# Patient Record
Sex: FEMALE | Race: WHITE | Employment: PART TIME | URBAN - METROPOLITAN AREA
[De-identification: names, ages, dates, MRNs, and addresses within clinical notes are randomized per-mention and may not be internally consistent; named-entity substitution may affect disease eponyms.]

---

## 2021-02-04 ENCOUNTER — HOSPITAL ENCOUNTER (OUTPATIENT)
Age: 41
Discharge: HOME | End: 2021-02-04
Payer: COMMERCIAL

## 2021-02-04 ENCOUNTER — OFFICE VISIT (OUTPATIENT)
Dept: ENDOCRINOLOGY | Age: 41
End: 2021-02-04
Payer: COMMERCIAL

## 2021-02-04 VITALS
OXYGEN SATURATION: 97 % | BODY MASS INDEX: 42.27 KG/M2 | DIASTOLIC BLOOD PRESSURE: 78 MMHG | SYSTOLIC BLOOD PRESSURE: 140 MMHG | WEIGHT: 278 LBS | HEART RATE: 83 BPM | TEMPERATURE: 95.8 F

## 2021-02-04 DIAGNOSIS — E55.9 VITAMIN D DEFICIENCY: ICD-10-CM

## 2021-02-04 DIAGNOSIS — E89.0 POSTOPERATIVE HYPOTHYROIDISM: ICD-10-CM

## 2021-02-04 DIAGNOSIS — E89.0 POSTOPERATIVE HYPOTHYROIDISM: Primary | ICD-10-CM

## 2021-02-04 LAB
T4 FREE: 1.56 NG/DL (ref 0.93–1.7)
T4 TOTAL: 8.9 MCG/DL (ref 4.5–11.7)
TSH SERPL DL<=0.05 MIU/L-ACNC: 0.7 UIU/ML (ref 0.27–4.2)
VITAMIN D 25-HYDROXY: 20 NG/ML (ref 30–100)

## 2021-02-04 PROCEDURE — 36415 COLL VENOUS BLD VENIPUNCTURE: CPT

## 2021-02-04 PROCEDURE — 84439 ASSAY OF FREE THYROXINE: CPT

## 2021-02-04 PROCEDURE — 99204 OFFICE O/P NEW MOD 45 MIN: CPT | Performed by: INTERNAL MEDICINE

## 2021-02-04 PROCEDURE — 84443 ASSAY THYROID STIM HORMONE: CPT

## 2021-02-04 PROCEDURE — 82306 VITAMIN D 25 HYDROXY: CPT

## 2021-02-04 RX ORDER — RIVAROXABAN 20 MG/1
TABLET, FILM COATED ORAL
COMMUNITY
Start: 2021-02-02

## 2021-02-04 RX ORDER — SERTRALINE HYDROCHLORIDE 100 MG/1
TABLET, FILM COATED ORAL
COMMUNITY
Start: 2021-02-03

## 2021-02-04 RX ORDER — LEVOTHYROXINE SODIUM 0.2 MG/1
TABLET ORAL
COMMUNITY
Start: 2021-01-27 | End: 2021-07-26 | Stop reason: SDUPTHER

## 2021-02-04 NOTE — PROGRESS NOTES
[address] [place] [address] Department of Endocrinology Diabetes and Metabolism   [address] [facility] [address]   Phone: [phone]  Fax: [phone]    Date of Service: 2021    Primary Care Physician: Douglas Jensen DO  Referring physician: Ronnell Moore*  Provider: Manfred Donis MD        Reason for the visit:  Postsurgical Hypothyroidism    History of Present Illness: The history is provided by the patient. No  was used. Accuracy of the patient data is excellent. Theador Dose is a very pleasant 36 y.o. 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 Pathology was benign     She is currently on Levothyroxine 200 mcg daily, Patient takes levothyroxine in the morning at empty stomach, wait one hour before eating , avoid multivitamins containing calcium  or iron with it. \    Lab Results   Component Value Date/Time    TSH 0.704 2021 04:04 PM    T4FREE 1.56 2021 04:04 PM    Z6AAFSJ 8.9 2021 04:04 PM     PAST MEDICAL HISTORY   Past Medical History:   Diagnosis Date    YVONNE (acute kidney injury) ([facility] [address] 2014    Blood clot in the legs 2011    left leg       PAST SURGICAL HISTORY   Past Surgical History:   Procedure Laterality Date     SECTION  08/29/2011    X2    THROMBECTOMY      TUBAL LIGATION      VENA CAVA FILTER PLACEMENT         SOCIAL HISTORY   Tobacco:   reports that she quit smoking about 9 years ago. She has never used smokeless tobacco.  Alcohol:   reports no history of alcohol use. Drugs:   reports no history of drug use. FAMILY HISTORY   No family history on file.     ALLERGIES AND DRUG REACTIONS   No Known Allergies    CURRENT MEDICATIONS   Current Outpatient Medications   Medication Sig Dispense Refill    levothyroxine (SYNTHROID) 200 MCG tablet TAKE ONE TABLET BY MOUTH EVERY DAY      sertraline (ZOLOFT) 100 MG tablet  XARELTO 20 MG TABS tablet       Multiple Vitamins-Minerals (THERAPEUTIC MULTIVITAMIN-MINERALS) tablet Take 1 tablet by mouth daily. No current facility-administered medications for this visit. Review of Systems  Constitutional: No fever, no chills, no diaphoresis, no generalized weakness. HEENT: No blurred vision, No sore throat, no ear pain, no hair loss  Neck: denied any neck swelling, difficulty swallowing,   Cadrdiopulomary: No CP, SOB or palpitation, No orthopnea or PND. No cough or wheezing. GI: No N/V/D, no constipation, No abdominal pain, no melena or hematochezia   : Denied any dysuria, hematuria, flank pain, discharge, or incontinence. Skin: denied any rash, ulcer, Hirsute, or hyperpigmentation. MSK: denied any joint deformity, joint pain/swelling, muscle pain, or back pain. Neuro: no numbess, no tingling, no weakness,     OBJECTIVE    BP (!) 140/78   Pulse 83   Temp 95.8 °F (35.4 °C)   Wt 278 lb (126.1 kg)   SpO2 97%   BMI 42.27 kg/m²   BP Readings from Last 4 Encounters:   02/04/21 (!) 140/78   09/30/11 124/86     Wt Readings from Last 6 Encounters:   02/04/21 278 lb (126.1 kg)   09/30/11 230 lb (104.3 kg)   09/29/11 230 lb (104.3 kg)       Physical examination:  General: awake alert, oriented x3, no abnormal position or movements. HEENT: normocephalic non traumatic  Neck: supple, no LN enlargement, s/p total thyroidectomy, no JVD. Pulm: Clear equal air entry no added sounds, no wheezing or rhonchi    CVS: S1 + S2, no murmur, no heave. Dorsalis pedis pulse palpable   Abd: soft lax, no tenderness, no organomegaly, audible bowel sounds. Skin: warm, no lesions, no rash.   Musculoskeletal: No back tenderness, no kyphosis/scoliosis   Neuro: CN intact, sensation normal , muscle power normal.  Psych: normal mood, and affect    Review of Laboratory Data:  I have reviewed the following:  Lab Results   Component Value Date/Time    WBC 5.4 05/18/2015 08:11 PM    RBC 4.89 05/18/2015 08:11 PM    HGB 13.3 05/18/2015 08:11 PM    HCT 40.3 05/18/2015 08:11 PM    MCV 82.4 05/18/2015 08:11 PM    MCH 27.3 05/18/2015 08:11 PM    MCHC 33.1 05/18/2015 08:11 PM    RDW 15.3 (H) 05/18/2015 08:11 PM     05/18/2015 08:11 PM    MPV 9.4 05/18/2015 08:11 PM      Lab Results   Component Value Date/Time     05/18/2015 08:11 PM    K 4.1 05/18/2015 08:11 PM    CO2 21 (L) 05/18/2015 08:11 PM    BUN 11 05/18/2015 08:11 PM    CREATININE 0.8 05/18/2015 08:11 PM    CALCIUM 9.0 05/18/2015 08:11 PM    LABGLOM >60 05/18/2015 08:11 PM    GFRAA >60 05/18/2015 08:11 PM      Lab Results   Component Value Date/Time    TSH 3.750 12/01/2014 06:43 AM    T4FREE 0.99 12/01/2014 06:43 AM     Lab Results   Component Value Date    GLUCOSE 94 05/18/2015    GLUCOSE 85 10/01/2011     Lab Results   Component Value Date    TRIG 122 12/01/2014    HDL 48 12/01/2014    LDLCALC 108 12/01/2014    CHOL 180 12/01/2014     No results found for: VITD25    ASSESSMENT & RECOMMENDATIONS   Conchis Douglas, a 36 y.o.-old female seen in for following issues     Postsurgical hypothyroidism   · At goal, continue Levothyroxine 200 mcg daily   · TFT before next visit     vitD deficiency   · Continue vitd supplement  · Check level     I personally reviewed external notes from PCP and other patient's care team providers, and personally interpreted labs associated with the above diagnosis. I also ordered labs to further assess and manage the above addressed medical conditions. Return in about 6 months (around 7/26/2021) for Hypothyroidism . The above issues were reviewed with the patient who understood and agreed with the plan. Thank you for allowing us to participate in the care of this patient. Please do not hesitate to contact us with any additional questions. Diagnosis Orders   1. Postoperative hypothyroidism  TSH without Reflex    T4, Free    T4   2.  Vitamin D deficiency  Vitamin D 25 Hydroxy     Efrain Abusag MD  Endocrinologist, [facility] Diabetes Care and Endocrinology   [address] [facility] [address]   Phone: [phone]  Fax: [phone]  ------------------------------  An electronic signature was used to authenticate this note.  Keturah Schwartz MD on 2/4/2021 at 2:41 PM

## 2021-02-05 ENCOUNTER — TELEPHONE (OUTPATIENT)
Dept: ENDOCRINOLOGY | Age: 41
End: 2021-02-05

## 2021-02-05 DIAGNOSIS — E55.9 VITAMIN D DEFICIENCY: Primary | ICD-10-CM

## 2021-02-05 NOTE — TELEPHONE ENCOUNTER
Notify pt,  I have reviewed your recent results    Thyroid hormones are very good. continue current dose of thyroid medication     vitD level was low. Take Vitamin D 50.000 units one tab once a week for 8 weeks. At the end of 8 weeks, start taking Vitamin D3 2000 units daily over the counter.  Prescription for weekly vitD sent to the pharmacy

## 2021-03-12 ENCOUNTER — HOSPITAL ENCOUNTER (OUTPATIENT)
Dept: GENERAL RADIOLOGY | Age: 41
Discharge: HOME | End: 2021-03-14
Payer: COMMERCIAL

## 2021-03-12 DIAGNOSIS — Z12.31 VISIT FOR SCREENING MAMMOGRAM: ICD-10-CM

## 2021-03-12 PROCEDURE — 77063 BREAST TOMOSYNTHESIS BI: CPT

## 2021-04-26 ENCOUNTER — HOSPITAL ENCOUNTER (OUTPATIENT)
Age: 41
Discharge: HOME | End: 2021-04-28

## 2021-04-26 PROCEDURE — 88305 TISSUE EXAM BY PATHOLOGIST: CPT

## 2021-07-26 ENCOUNTER — OFFICE VISIT (OUTPATIENT)
Dept: ENDOCRINOLOGY | Age: 41
End: 2021-07-26
Payer: COMMERCIAL

## 2021-07-26 VITALS
SYSTOLIC BLOOD PRESSURE: 132 MMHG | DIASTOLIC BLOOD PRESSURE: 74 MMHG | OXYGEN SATURATION: 97 % | HEIGHT: 68 IN | WEIGHT: 289 LBS | BODY MASS INDEX: 43.8 KG/M2 | HEART RATE: 78 BPM | RESPIRATION RATE: 18 BRPM

## 2021-07-26 DIAGNOSIS — E55.9 VITAMIN D DEFICIENCY: Primary | ICD-10-CM

## 2021-07-26 DIAGNOSIS — E89.0 POSTOPERATIVE HYPOTHYROIDISM: ICD-10-CM

## 2021-07-26 PROCEDURE — 99214 OFFICE O/P EST MOD 30 MIN: CPT | Performed by: INTERNAL MEDICINE

## 2021-07-26 RX ORDER — LEVOTHYROXINE SODIUM 0.2 MG/1
200 TABLET ORAL DAILY
Qty: 90 TABLET | Refills: 3 | Status: SHIPPED
Start: 2021-07-26 | End: 2022-08-09 | Stop reason: SDUPTHER

## 2021-07-26 NOTE — PROGRESS NOTES
[address] [place] [address] Department of Endocrinology Diabetes and Metabolism   [address] [facility], [address] [place] [address],[address] [address]   Phone: [phone]  Fax: [phone]    Date of Service: 2021  Primary Care Physician: Taryn Villavicencio DO  Provider: Ac Dallas MD        Reason for the visit:  Postsurgical Hypothyroidism    History of Present Illness: The history is provided by the patient. No  was used. Accuracy of the patient data is excellent. Rea Stanley is a very pleasant 39 y.o. 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 Pathology was benign     She is currently on Levothyroxine 200 mcg daily, Patient takes levothyroxine in the morning at empty stomach, wait one hour before eating , avoid multivitamins containing calcium  or iron with it. \    Lab Results   Component Value Date/Time    TSH 0.704 2021 04:04 PM    T4FREE 1.56 2021 04:04 PM    X0LWPMX 8.9 2021 04:04 PM     PAST MEDICAL HISTORY   Past Medical History:   Diagnosis Date    YVONNE (acute kidney injury) ([address] [address] 2014    Blood clot in the legs 2011    left leg       PAST SURGICAL HISTORY   Past Surgical History:   Procedure Laterality Date     SECTION  08/29/2011    X2    THROMBECTOMY      TUBAL LIGATION      VENA CAVA FILTER PLACEMENT         SOCIAL HISTORY   Tobacco:   reports that she quit smoking about 9 years ago. She has never used smokeless tobacco.  Alcohol:   reports no history of alcohol use. Drugs:   reports no history of drug use. FAMILY HISTORY   No family history on file.     ALLERGIES AND DRUG REACTIONS   No Known Allergies    CURRENT MEDICATIONS   Current Outpatient Medications   Medication Sig Dispense Refill    levothyroxine (SYNTHROID) 200 MCG tablet Take 1 tablet by mouth Daily 90 tablet 3    vitamin D (CHOLECALCIFEROL) 60275 UNIT CAPS Take 1 capsule weekly 8 capsule 0    sertraline (ZOLOFT) 100 MG tablet       XARELTO 20 MG TABS tablet       Multiple Vitamins-Minerals (THERAPEUTIC MULTIVITAMIN-MINERALS) tablet Take 1 tablet by mouth daily. No current facility-administered medications for this visit. Review of Systems  Constitutional: No fever, no chills, no diaphoresis, no generalized weakness. HEENT: No blurred vision, No sore throat, no ear pain, no hair loss  Neck: denied any neck swelling, difficulty swallowing,   Cadrdiopulomary: No CP, SOB or palpitation, No orthopnea or PND. No cough or wheezing. GI: No N/V/D, no constipation, No abdominal pain, no melena or hematochezia   : Denied any dysuria, hematuria, flank pain, discharge, or incontinence. Skin: denied any rash, ulcer, Hirsute, or hyperpigmentation. MSK: denied any joint deformity, joint pain/swelling, muscle pain, or back pain. Neuro: no numbess, no tingling, no weakness,     OBJECTIVE    /74   Pulse 78   Resp 18   Ht 5' 8\" (1.727 m)   Wt 289 lb (131.1 kg)   SpO2 97%   BMI 43.94 kg/m²   BP Readings from Last 4 Encounters:   07/26/21 132/74   02/04/21 (!) 140/78   09/30/11 124/86     Wt Readings from Last 6 Encounters:   07/26/21 289 lb (131.1 kg)   02/04/21 278 lb (126.1 kg)   09/30/11 230 lb (104.3 kg)   09/29/11 230 lb (104.3 kg)       Physical examination:  General: awake alert, oriented x3, no abnormal position or movements. HEENT: normocephalic non traumatic  Neck: supple, no LN enlargement, s/p total thyroidectomy, no JVD. Pulm: Clear equal air entry no added sounds, no wheezing or rhonchi    CVS: S1 + S2, no murmur, no heave. Dorsalis pedis pulse palpable   Abd: soft lax, no tenderness, no organomegaly, audible bowel sounds. Skin: warm, no lesions, no rash.   Musculoskeletal: No back tenderness, no kyphosis/scoliosis   Neuro: CN intact, sensation normal , muscle power normal.  Psych: normal mood, and affect    Review of Laboratory Data:  I have reviewed the following:  Lab Results   Component Value Date/Time    WBC 5.4 05/18/2015 08:11 PM    RBC 4.89 05/18/2015 08:11 PM    HGB 13.3 05/18/2015 08:11 PM    HCT 40.3 05/18/2015 08:11 PM    MCV 82.4 05/18/2015 08:11 PM    MCH 27.3 05/18/2015 08:11 PM    MCHC 33.1 05/18/2015 08:11 PM    RDW 15.3 (H) 05/18/2015 08:11 PM     05/18/2015 08:11 PM    MPV 9.4 05/18/2015 08:11 PM      Lab Results   Component Value Date/Time     05/18/2015 08:11 PM    K 4.1 05/18/2015 08:11 PM    CO2 21 (L) 05/18/2015 08:11 PM    BUN 11 05/18/2015 08:11 PM    CREATININE 0.8 05/18/2015 08:11 PM    CALCIUM 9.0 05/18/2015 08:11 PM    LABGLOM >60 05/18/2015 08:11 PM    GFRAA >60 05/18/2015 08:11 PM      Lab Results   Component Value Date/Time    TSH 0.704 02/04/2021 04:04 PM    T4FREE 1.56 02/04/2021 04:04 PM    [id number] 8.9 02/04/2021 04:04 PM     Lab Results   Component Value Date    GLUCOSE 94 05/18/2015    GLUCOSE 85 10/01/2011     Lab Results   Component Value Date    TRIG 122 12/01/2014    HDL 48 12/01/2014    LDLCALC 108 12/01/2014    CHOL 180 12/01/2014     Lab Results   Component Value Date    VITD25 20 02/04/2021       ASSESSMENT & RECOMMENDATIONS   Yaneli Way, a 39 y.o.-old female seen in for following issues     Postsurgical hypothyroidism   · Levothyroxine 200 mcg daily   · TFT in few weeks      vitD deficiency   · Continue vitd supplement supplement   · Check level     I personally reviewed external notes from PCP and other patient's care team providers, and personally interpreted labs associated with the above diagnosis. I also ordered labs to further assess and manage the above addressed medical conditions. Return in about 1 year (around 7/26/2022) for hypothyroidism, VitD deficiency. The above issues were reviewed with the patient who understood and agreed with the plan. Thank you for allowing us to participate in the care of this patient. Please do not hesitate to contact us with any additional questions.     Diagnosis Orders 1. Vitamin D deficiency  Vitamin D 25 Hydroxy    Basic Metabolic Panel   2. Postoperative hypothyroidism  TSH without Reflex    T4, Free    levothyroxine (SYNTHROID) 200 MCG tablet     Elin Souza MD  Endocrinologist, [place] [address] Diabetes Care and Endocrinology   [address],[facility] [address] [address]   Phone: [phone]  Fax: [phone]  ------------------------------  An electronic signature was used to authenticate this note.  Anu Borjas MD on 7/26/2021 at 2:31 PM

## 2021-09-19 ENCOUNTER — TELEPHONE (OUTPATIENT)
Dept: ENDOCRINOLOGY | Age: 41
End: 2021-09-19

## 2021-09-21 NOTE — TELEPHONE ENCOUNTER
Called pt and left VM informing to get labs done and requested a call back to make sure the message was received.

## 2022-08-08 ENCOUNTER — HOSPITAL ENCOUNTER (OUTPATIENT)
Age: 42
Discharge: HOME | End: 2022-08-08
Payer: COMMERCIAL

## 2022-08-08 DIAGNOSIS — E89.0 POSTOPERATIVE HYPOTHYROIDISM: ICD-10-CM

## 2022-08-08 DIAGNOSIS — E89.0 POSTOPERATIVE HYPOTHYROIDISM: Primary | ICD-10-CM

## 2022-08-08 LAB
T4 FREE: 1.84 NG/DL (ref 0.93–1.7)
TSH SERPL DL<=0.05 MIU/L-ACNC: 1.21 UIU/ML (ref 0.27–4.2)

## 2022-08-08 PROCEDURE — 84443 ASSAY THYROID STIM HORMONE: CPT

## 2022-08-08 PROCEDURE — 36415 COLL VENOUS BLD VENIPUNCTURE: CPT

## 2022-08-08 PROCEDURE — 84439 ASSAY OF FREE THYROXINE: CPT

## 2022-08-09 ENCOUNTER — OFFICE VISIT (OUTPATIENT)
Dept: ENDOCRINOLOGY | Age: 42
End: 2022-08-09
Payer: COMMERCIAL

## 2022-08-09 VITALS
BODY MASS INDEX: 42.59 KG/M2 | HEART RATE: 78 BPM | HEIGHT: 68 IN | OXYGEN SATURATION: 97 % | WEIGHT: 281 LBS | DIASTOLIC BLOOD PRESSURE: 81 MMHG | SYSTOLIC BLOOD PRESSURE: 124 MMHG

## 2022-08-09 DIAGNOSIS — E55.9 VITAMIN D DEFICIENCY: ICD-10-CM

## 2022-08-09 DIAGNOSIS — E89.0 POSTOPERATIVE HYPOTHYROIDISM: Primary | ICD-10-CM

## 2022-08-09 PROCEDURE — 99214 OFFICE O/P EST MOD 30 MIN: CPT | Performed by: CLINICAL NURSE SPECIALIST

## 2022-08-09 RX ORDER — LEVOTHYROXINE SODIUM 0.2 MG/1
200 TABLET ORAL DAILY
Qty: 90 TABLET | Refills: 3 | Status: SHIPPED | OUTPATIENT
Start: 2022-08-09

## 2022-08-09 RX ORDER — BUSPIRONE HYDROCHLORIDE 7.5 MG/1
TABLET ORAL
COMMUNITY
Start: 2022-07-26

## 2022-08-09 NOTE — PROGRESS NOTES
[address] [place] [address] Department of Endocrinology Diabetes and Metabolism   [address] [facility] [address]   Phone: [phone]  Fax: [phone]    Date of Service: 2022  Primary Care Physician: Phil Aly DO  Provider: DANIEL Munoz      Reason for the visit:  Postsurgical Hypothyroidism    History of Present Illness: The history is provided by the patient. No  was used. Accuracy of the patient data is excellent. Fabio Phelan is a very pleasant 43 y.o. 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 Pathology was benign     She is currently on Levothyroxine 200 mcg daily, Patient takes levothyroxine in the morning at empty stomach, wait one hour before eating , avoid multivitamins containing calcium  or iron with it. Lab Results   Component Value Date/Time    TSH 1.210 2022 02:46 PM    T4FREE 1.84 (H) 2022 02:46 PM    E4RPDJX 8.9 2021 04:04 PM     PAST MEDICAL HISTORY   Past Medical History:   Diagnosis Date    YVONNE (acute kidney injury) ([facility] [address] 2014    Blood clot in the legs 2011    left leg       PAST SURGICAL HISTORY   Past Surgical History:   Procedure Laterality Date     SECTION  08/29/2011    X2    THROMBECTOMY      TUBAL LIGATION      VENA CAVA FILTER PLACEMENT         SOCIAL HISTORY   Tobacco:   reports that she quit smoking about 10 years ago. She has never used smokeless tobacco.  Alcohol:   reports no history of alcohol use. Drugs:   reports no history of drug use. FAMILY HISTORY   No family history on file. ALLERGIES AND DRUG REACTIONS   No Known Allergies    CURRENT MEDICATIONS   Current Outpatient Medications   Medication Sig Dispense Refill    busPIRone (BUSPAR) 7.5 MG tablet TAKE ONE TABLET BY MOUTH EVERY DAY      levothyroxine (SYNTHROID) 200 MCG tablet Take 1 tablet by mouth in the morning.  90 tablet 3 vitamin D (CHOLECALCIFEROL) 79870 UNIT CAPS Take 1 capsule weekly 8 capsule 0    sertraline (ZOLOFT) 100 MG tablet       XARELTO 20 MG TABS tablet       Multiple Vitamins-Minerals (THERAPEUTIC MULTIVITAMIN-MINERALS) tablet Take 1 tablet by mouth daily. No current facility-administered medications for this visit. Review of Systems  Constitutional: No fever, no chills, no diaphoresis, no generalized weakness. HEENT: No blurred vision, No sore throat, no ear pain, no hair loss  Neck: denied any neck swelling, difficulty swallowing,   Cadrdiopulomary: No CP, SOB or palpitation, No orthopnea or PND. No cough or wheezing. GI: No N/V/D, no constipation, No abdominal pain, no melena or hematochezia   : Denied any dysuria, hematuria, flank pain, discharge, or incontinence. Skin: denied any rash, ulcer, Hirsute, or hyperpigmentation. MSK: denied any joint deformity, joint pain/swelling, muscle pain, or back pain. Neuro: no numbess, no tingling, no weakness,     OBJECTIVE    /81   Pulse 78   Ht 5' 8\" (1.727 m)   Wt 281 lb (127.5 kg)   SpO2 97%   BMI 42.73 kg/m²   BP Readings from Last 4 Encounters:   08/09/22 124/81   07/26/21 132/74   02/04/21 (!) 140/78   09/30/11 124/86     Wt Readings from Last 6 Encounters:   08/09/22 281 lb (127.5 kg)   07/26/21 289 lb (131.1 kg)   02/04/21 278 lb (126.1 kg)   09/30/11 230 lb (104.3 kg)   09/29/11 230 lb (104.3 kg)       Physical examination:  General: awake alert, oriented x3, no abnormal position or movements. HEENT: normocephalic non traumatic  Neck: supple, no LN enlargement, s/p total thyroidectomy, no JVD. Pulm: Clear equal air entry no added sounds, no wheezing or rhonchi    CVS: S1 + S2, no murmur, no heave. Dorsalis pedis pulse palpable   Abd: soft lax, no tenderness, no organomegaly, audible bowel sounds. Skin: warm, no lesions, no rash.   Musculoskeletal: No back tenderness, no kyphosis/scoliosis   Neuro: CN intact, sensation normal , muscle addressed medical conditions. Return in about 1 year (around 8/9/2023). The above issues were reviewed with the patient who understood and agreed with the plan. Thank you for allowing us to participate in the care of this patient. Please do not hesitate to contact us with any additional questions. Diagnosis Orders   1. Postoperative hypothyroidism  levothyroxine (SYNTHROID) 200 MCG tablet      2. Vitamin D deficiency          Curlie Calvillo, APRN - CNS    Endocrinologist, [facility]   [address] [place] [address]   Phone: [phone]  Fax: [phone]  ------------------------------  An electronic signature was used to authenticate this note.  DANIEL Marin  on 8/9/2022 at 4:08 PM

## 2023-05-05 ENCOUNTER — HOSPITAL ENCOUNTER (OUTPATIENT)
Dept: GENERAL RADIOLOGY | Age: 43
Discharge: HOME | End: 2023-05-05
Payer: COMMERCIAL

## 2023-05-05 DIAGNOSIS — Z12.31 VISIT FOR SCREENING MAMMOGRAM: ICD-10-CM

## 2023-05-05 PROCEDURE — 77063 BREAST TOMOSYNTHESIS BI: CPT

## 2023-05-08 DIAGNOSIS — R92.8 ABNORMAL MAMMOGRAM: Primary | ICD-10-CM

## 2023-05-09 ENCOUNTER — TELEPHONE (OUTPATIENT)
Dept: GENERAL RADIOLOGY | Age: 43
End: 2023-05-09

## 2023-05-09 NOTE — TELEPHONE ENCOUNTER
Call to patient in reference to her mammogram performed at [facility] on May 5, 2023. Instructed patient that the radiologist has recommended some additional breast imaging, in order to make a final determination/result. Verbalizes understanding and is agreeable to proceed.

## 2023-05-12 ENCOUNTER — HOSPITAL ENCOUNTER (OUTPATIENT)
Dept: GENERAL RADIOLOGY | Age: 43
Discharge: HOME | End: 2023-05-12
Payer: COMMERCIAL

## 2023-05-12 ENCOUNTER — APPOINTMENT (OUTPATIENT)
Dept: GENERAL RADIOLOGY | Age: 43
End: 2023-05-12
Payer: COMMERCIAL

## 2023-05-12 DIAGNOSIS — R92.8 ABNORMAL MAMMOGRAM: ICD-10-CM

## 2023-05-12 PROCEDURE — 76642 ULTRASOUND BREAST LIMITED: CPT

## 2023-05-12 PROCEDURE — 77065 DX MAMMO INCL CAD UNI: CPT

## 2023-05-12 PROCEDURE — G0279 TOMOSYNTHESIS, MAMMO: HCPCS

## 2023-08-09 DIAGNOSIS — E89.0 POSTOPERATIVE HYPOTHYROIDISM: ICD-10-CM

## 2023-08-10 RX ORDER — LEVOTHYROXINE SODIUM 0.2 MG/1
200 TABLET ORAL DAILY
Qty: 90 TABLET | Refills: 5 | Status: SHIPPED | OUTPATIENT
Start: 2023-08-10

## 2023-10-02 ENCOUNTER — TELEPHONE (OUTPATIENT)
Dept: BREAST CENTER | Age: 43
End: 2023-10-02

## 2023-10-02 NOTE — TELEPHONE ENCOUNTER
RN contacted patient PCP office Juan Daniel Bridges who is the provider that prescribes patients Xarelto 20mg. RN spoke to Zhane who does referral in regards to if a biopsy Is warranted once our radiologist review the films we need instructions for patients Xarelto. Zhane is going to verify with  the instructions for patient and will be back in touch once doctor responds. RN verbalized understanding.        Electronically signed by Chelsie Santos RN on 10/2/23 at 3:27 PM EDT

## 2023-10-02 NOTE — TELEPHONE ENCOUNTER
RN received call from patient in regards to referral from [address] [place] [address] for abnormal imaging left breast mass. RN reviewed patient imaging and scanned in patient referral and external imaging. RN spoke to patient in regards to recommendations and the way the referral will move forward. RN explained bx is recommended and because her imaging was done at [facility] she needs to contact them and request recent breast MRI place on disc and bring to our office. RN explained once we received disc we will have uploaded into our system and our breast radiologist will review it. Once radiologist review imaging and give recommendations we will then be back in contact with patient to discuss recommendations. Patient is taking Xarelto 20mg prescribed by . RN will contact PCP and request written instructions on patients holding prior to biopsy. RN informed patient of this information. Patient verbalized understanding.        Electronically signed by Jose C Aragon RN on 10/2/23 at 12:17 PM EDT

## 2023-10-03 ENCOUNTER — HOSPITAL ENCOUNTER (EMERGENCY)
Age: 43
Discharge: HOME | End: 2023-10-03
Attending: STUDENT IN AN ORGANIZED HEALTH CARE EDUCATION/TRAINING PROGRAM
Payer: COMMERCIAL

## 2023-10-03 ENCOUNTER — APPOINTMENT (OUTPATIENT)
Dept: CT IMAGING | Age: 43
End: 2023-10-03
Payer: COMMERCIAL

## 2023-10-03 VITALS
WEIGHT: 253 LBS | TEMPERATURE: 98.1 F | SYSTOLIC BLOOD PRESSURE: 139 MMHG | BODY MASS INDEX: 37.47 KG/M2 | DIASTOLIC BLOOD PRESSURE: 78 MMHG | HEIGHT: 69 IN | HEART RATE: 66 BPM | RESPIRATION RATE: 16 BRPM | OXYGEN SATURATION: 98 %

## 2023-10-03 DIAGNOSIS — R10.9 ABDOMINAL PAIN, UNSPECIFIED ABDOMINAL LOCATION: Primary | ICD-10-CM

## 2023-10-03 DIAGNOSIS — K80.20 GALL STONES: ICD-10-CM

## 2023-10-03 LAB
ALBUMIN SERPL-MCNC: 3.6 G/DL (ref 3.5–5.2)
ALP SERPL-CCNC: 35 U/L (ref 35–104)
ALT SERPL-CCNC: 72 U/L (ref 0–32)
ANION GAP SERPL CALCULATED.3IONS-SCNC: 11 MMOL/L (ref 7–16)
AST SERPL-CCNC: 145 U/L (ref 0–31)
BACTERIA URNS QL MICRO: ABNORMAL
BASOPHILS # BLD: 0.02 K/UL (ref 0–0.2)
BASOPHILS NFR BLD: 0 % (ref 0–2)
BILIRUB SERPL-MCNC: 1.8 MG/DL (ref 0–1.2)
BILIRUB UR QL STRIP: ABNORMAL
BUN SERPL-MCNC: 10 MG/DL (ref 6–20)
CALCIUM SERPL-MCNC: 8.6 MG/DL (ref 8.6–10.2)
CHLORIDE SERPL-SCNC: 104 MMOL/L (ref 98–107)
CLARITY UR: ABNORMAL
CO2 SERPL-SCNC: 24 MMOL/L (ref 22–29)
COLOR UR: YELLOW
CREAT SERPL-MCNC: 1 MG/DL (ref 0.5–1)
EOSINOPHIL # BLD: 0.2 K/UL (ref 0.05–0.5)
EOSINOPHILS RELATIVE PERCENT: 4 % (ref 0–6)
EPI CELLS #/AREA URNS HPF: ABNORMAL /HPF
ERYTHROCYTE [DISTWIDTH] IN BLOOD BY AUTOMATED COUNT: 13.8 % (ref 11.5–15)
GFR SERPL CREATININE-BSD FRML MDRD: >60 ML/MIN/1.73M2
GLUCOSE SERPL-MCNC: 88 MG/DL (ref 74–99)
GLUCOSE UR STRIP-MCNC: NEGATIVE MG/DL
HCT VFR BLD AUTO: 43.6 % (ref 34–48)
HGB BLD-MCNC: 14.4 G/DL (ref 11.5–15.5)
HGB UR QL STRIP.AUTO: NEGATIVE
IMM GRANULOCYTES # BLD AUTO: <0.03 K/UL (ref 0–0.58)
IMM GRANULOCYTES NFR BLD: 0 % (ref 0–5)
KETONES UR STRIP-MCNC: >80 MG/DL
LEUKOCYTE ESTERASE UR QL STRIP: ABNORMAL
LYMPHOCYTES NFR BLD: 1.3 K/UL (ref 1.5–4)
LYMPHOCYTES RELATIVE PERCENT: 23 % (ref 20–42)
MCH RBC QN AUTO: 29 PG (ref 26–35)
MCHC RBC AUTO-ENTMCNC: 33 G/DL (ref 32–34.5)
MCV RBC AUTO: 87.9 FL (ref 80–99.9)
MONOCYTES NFR BLD: 0.26 K/UL (ref 0.1–0.95)
MONOCYTES NFR BLD: 5 % (ref 2–12)
NEUTROPHILS NFR BLD: 68 % (ref 43–80)
NEUTS SEG NFR BLD: 3.83 K/UL (ref 1.8–7.3)
NITRITE UR QL STRIP: NEGATIVE
PH UR STRIP: 5.5 [PH] (ref 5–9)
PLATELET # BLD AUTO: 158 K/UL (ref 130–450)
PLATELET CONFIRMATION: NORMAL
PMV BLD AUTO: 12.5 FL (ref 7–12)
POTASSIUM SERPL-SCNC: 3.6 MMOL/L (ref 3.5–5)
PROT SERPL-MCNC: 6.2 G/DL (ref 6.4–8.3)
PROT UR STRIP-MCNC: NEGATIVE MG/DL
RBC # BLD AUTO: 4.96 M/UL (ref 3.5–5.5)
RBC #/AREA URNS HPF: ABNORMAL /HPF
SODIUM SERPL-SCNC: 139 MMOL/L (ref 132–146)
SP GR UR STRIP: 1.02 (ref 1–1.03)
UROBILINOGEN UR STRIP-ACNC: 4 EU/DL (ref 0–1)
WBC #/AREA URNS HPF: ABNORMAL /HPF
WBC OTHER # BLD: 5.6 K/UL (ref 4.5–11.5)

## 2023-10-03 PROCEDURE — 80053 COMPREHEN METABOLIC PANEL: CPT

## 2023-10-03 PROCEDURE — 85025 COMPLETE CBC W/AUTO DIFF WBC: CPT

## 2023-10-03 PROCEDURE — 74177 CT ABD & PELVIS W/CONTRAST: CPT

## 2023-10-03 PROCEDURE — 6360000002 HC RX W HCPCS: Performed by: STUDENT IN AN ORGANIZED HEALTH CARE EDUCATION/TRAINING PROGRAM

## 2023-10-03 PROCEDURE — 2580000003 HC RX 258: Performed by: STUDENT IN AN ORGANIZED HEALTH CARE EDUCATION/TRAINING PROGRAM

## 2023-10-03 PROCEDURE — 81001 URINALYSIS AUTO W/SCOPE: CPT

## 2023-10-03 PROCEDURE — 93005 ELECTROCARDIOGRAM TRACING: CPT | Performed by: NURSE PRACTITIONER

## 2023-10-03 PROCEDURE — 96374 THER/PROPH/DIAG INJ IV PUSH: CPT

## 2023-10-03 PROCEDURE — 99285 EMERGENCY DEPT VISIT HI MDM: CPT

## 2023-10-03 PROCEDURE — 6360000004 HC RX CONTRAST MEDICATION: Performed by: STUDENT IN AN ORGANIZED HEALTH CARE EDUCATION/TRAINING PROGRAM

## 2023-10-03 RX ORDER — 0.9 % SODIUM CHLORIDE 0.9 %
1000 INTRAVENOUS SOLUTION INTRAVENOUS ONCE
Status: COMPLETED | OUTPATIENT
Start: 2023-10-03 | End: 2023-10-03

## 2023-10-03 RX ORDER — MORPHINE SULFATE 4 MG/ML
4 INJECTION, SOLUTION INTRAMUSCULAR; INTRAVENOUS ONCE
Status: COMPLETED | OUTPATIENT
Start: 2023-10-03 | End: 2023-10-03

## 2023-10-03 RX ORDER — VALSARTAN 80 MG/1
80 TABLET ORAL DAILY
COMMUNITY

## 2023-10-03 RX ADMIN — IOPAMIDOL 75 ML: 755 INJECTION, SOLUTION INTRAVENOUS at 20:42

## 2023-10-03 RX ADMIN — MORPHINE SULFATE 4 MG: 4 INJECTION, SOLUTION INTRAMUSCULAR; INTRAVENOUS at 19:28

## 2023-10-03 RX ADMIN — SODIUM CHLORIDE 1000 ML: 9 INJECTION, SOLUTION INTRAVENOUS at 19:26

## 2023-10-03 ASSESSMENT — PAIN DESCRIPTION - ORIENTATION
ORIENTATION: UPPER
ORIENTATION: MID

## 2023-10-03 ASSESSMENT — PAIN DESCRIPTION - LOCATION
LOCATION: BACK;ABDOMEN
LOCATION: ABDOMEN

## 2023-10-03 ASSESSMENT — PAIN DESCRIPTION - FREQUENCY: FREQUENCY: CONTINUOUS

## 2023-10-03 ASSESSMENT — PAIN DESCRIPTION - DESCRIPTORS
DESCRIPTORS: PRESSURE;DISCOMFORT
DESCRIPTORS: ACHING;PRESSURE

## 2023-10-03 ASSESSMENT — PAIN DESCRIPTION - PAIN TYPE: TYPE: ACUTE PAIN

## 2023-10-03 ASSESSMENT — PAIN - FUNCTIONAL ASSESSMENT
PAIN_FUNCTIONAL_ASSESSMENT: 0-10
PAIN_FUNCTIONAL_ASSESSMENT: NONE - DENIES PAIN
PAIN_FUNCTIONAL_ASSESSMENT: PREVENTS OR INTERFERES WITH MANY ACTIVE NOT PASSIVE ACTIVITIES

## 2023-10-03 ASSESSMENT — PAIN DESCRIPTION - ONSET: ONSET: SUDDEN

## 2023-10-03 ASSESSMENT — PAIN SCALES - GENERAL
PAINLEVEL_OUTOF10: 5
PAINLEVEL_OUTOF10: 5

## 2023-10-04 LAB
EKG ATRIAL RATE: 73 BPM
EKG P AXIS: 67 DEGREES
EKG P-R INTERVAL: 160 MS
EKG Q-T INTERVAL: 416 MS
EKG QRS DURATION: 86 MS
EKG QTC CALCULATION (BAZETT): 458 MS
EKG R AXIS: 54 DEGREES
EKG T AXIS: 20 DEGREES
EKG VENTRICULAR RATE: 73 BPM

## 2023-10-24 ENCOUNTER — OFFICE VISIT (OUTPATIENT)
Dept: BREAST CENTER | Age: 43
End: 2023-10-24
Payer: COMMERCIAL

## 2023-10-24 VITALS
HEIGHT: 68 IN | DIASTOLIC BLOOD PRESSURE: 80 MMHG | SYSTOLIC BLOOD PRESSURE: 132 MMHG | HEART RATE: 67 BPM | TEMPERATURE: 98.6 F | WEIGHT: 243 LBS | BODY MASS INDEX: 36.83 KG/M2 | OXYGEN SATURATION: 99 % | RESPIRATION RATE: 14 BRPM

## 2023-10-24 DIAGNOSIS — N63.0 BREAST MASS IN FEMALE: Primary | ICD-10-CM

## 2023-10-24 PROCEDURE — 99203 OFFICE O/P NEW LOW 30 MIN: CPT | Performed by: SURGERY

## 2023-10-24 RX ORDER — ROSUVASTATIN CALCIUM 20 MG/1
20 TABLET, COATED ORAL DAILY
COMMUNITY
Start: 2023-10-05

## 2023-11-07 ENCOUNTER — HOSPITAL ENCOUNTER (OUTPATIENT)
Dept: MRI IMAGING | Age: 43
Discharge: HOME | End: 2023-11-09
Attending: SURGERY
Payer: COMMERCIAL

## 2023-11-07 DIAGNOSIS — N63.0 BREAST MASS IN FEMALE: ICD-10-CM

## 2023-11-07 PROCEDURE — 19085 BX BREAST 1ST LESION MR IMAG: CPT

## 2023-11-07 PROCEDURE — 6360000004 HC RX CONTRAST MEDICATION: Performed by: RADIOLOGY

## 2023-11-07 PROCEDURE — A9585 GADOBUTROL INJECTION: HCPCS | Performed by: RADIOLOGY

## 2023-11-07 RX ORDER — GADOBUTROL 604.72 MG/ML
10 INJECTION INTRAVENOUS
Status: COMPLETED | OUTPATIENT
Start: 2023-11-07 | End: 2023-11-07

## 2023-11-07 RX ADMIN — GADOBUTROL 10 ML: 604.72 INJECTION INTRAVENOUS at 12:45

## 2023-11-10 ENCOUNTER — HOSPITAL ENCOUNTER (OUTPATIENT)
Age: 43
Discharge: HOME | End: 2023-11-12

## 2023-11-10 PROCEDURE — 88304 TISSUE EXAM BY PATHOLOGIST: CPT

## 2023-11-17 LAB — SURGICAL PATHOLOGY REPORT: NORMAL

## 2024-01-25 ENCOUNTER — TELEPHONE (OUTPATIENT)
Dept: VASCULAR SURGERY | Age: 44
End: 2024-01-25

## 2024-01-25 NOTE — TELEPHONE ENCOUNTER
Received referral from Dr. Lund for Dr. Lucero regarding variceal dilatation of left ovarian vein.  Called Dr. Nguyen office spoke with jose regarding this referral, we do not see patients for this and to send either to [place] or [place]

## 2024-05-20 ENCOUNTER — HOSPITAL ENCOUNTER (OUTPATIENT)
Dept: GENERAL RADIOLOGY | Age: 44
Discharge: HOME | End: 2024-05-22
Payer: COMMERCIAL

## 2024-05-20 ENCOUNTER — OFFICE VISIT (OUTPATIENT)
Dept: BREAST CENTER | Age: 44
End: 2024-05-20
Payer: COMMERCIAL

## 2024-05-20 VITALS
OXYGEN SATURATION: 97 % | WEIGHT: 225 LBS | TEMPERATURE: 97.9 F | BODY MASS INDEX: 34.1 KG/M2 | DIASTOLIC BLOOD PRESSURE: 78 MMHG | RESPIRATION RATE: 20 BRPM | SYSTOLIC BLOOD PRESSURE: 116 MMHG | HEART RATE: 67 BPM | HEIGHT: 68 IN

## 2024-05-20 VITALS — WEIGHT: 223 LBS | BODY MASS INDEX: 33.91 KG/M2

## 2024-05-20 DIAGNOSIS — N63.0 BREAST MASS IN FEMALE: ICD-10-CM

## 2024-05-20 DIAGNOSIS — Z15.89 MTHFR MUTATION: Primary | ICD-10-CM

## 2024-05-20 PROCEDURE — 99213 OFFICE O/P EST LOW 20 MIN: CPT | Performed by: SURGERY

## 2024-05-20 PROCEDURE — 99212 OFFICE O/P EST SF 10 MIN: CPT | Performed by: SURGERY

## 2024-05-20 PROCEDURE — 77063 BREAST TOMOSYNTHESIS BI: CPT

## 2024-05-20 NOTE — PATIENT INSTRUCTIONS
Patient will be scheduled 6 month breast MRI   - will need lab work prior to test - Patient does not have cycles    Genetic counselor referral has been submitted their office will contact patient  -- [phone]

## 2024-05-23 ENCOUNTER — TELEPHONE (OUTPATIENT)
Dept: BREAST CENTER | Age: 44
End: 2024-05-23

## 2024-05-23 NOTE — TELEPHONE ENCOUNTER
Referral has been submitted to Genetic Counselor - Elena Leyva  - Patient information has been faxed and their office staff will contact patient with an appointment.

## 2024-06-21 ENCOUNTER — HOSPITAL ENCOUNTER (OUTPATIENT)
Age: 44
Discharge: HOME | End: 2024-06-21
Payer: COMMERCIAL

## 2024-06-21 DIAGNOSIS — E89.0 POSTOPERATIVE HYPOTHYROIDISM: ICD-10-CM

## 2024-06-21 LAB
T4 FREE SERPL-MCNC: 1.7 NG/DL (ref 0.9–1.7)
TSH SERPL DL<=0.05 MIU/L-ACNC: 0.43 UIU/ML (ref 0.27–4.2)

## 2024-06-21 PROCEDURE — 36415 COLL VENOUS BLD VENIPUNCTURE: CPT

## 2024-06-21 PROCEDURE — 84443 ASSAY THYROID STIM HORMONE: CPT

## 2024-06-21 PROCEDURE — 84439 ASSAY OF FREE THYROXINE: CPT

## 2024-06-24 ENCOUNTER — OFFICE VISIT (OUTPATIENT)
Dept: ENDOCRINOLOGY | Age: 44
End: 2024-06-24
Payer: COMMERCIAL

## 2024-06-24 VITALS
SYSTOLIC BLOOD PRESSURE: 114 MMHG | DIASTOLIC BLOOD PRESSURE: 77 MMHG | BODY MASS INDEX: 34.1 KG/M2 | HEART RATE: 80 BPM | OXYGEN SATURATION: 100 % | WEIGHT: 225 LBS | RESPIRATION RATE: 18 BRPM | HEIGHT: 68 IN

## 2024-06-24 DIAGNOSIS — E89.0 POSTOPERATIVE HYPOTHYROIDISM: Primary | ICD-10-CM

## 2024-06-24 DIAGNOSIS — E55.9 VITAMIN D DEFICIENCY: ICD-10-CM

## 2024-06-24 PROCEDURE — 99214 OFFICE O/P EST MOD 30 MIN: CPT | Performed by: CLINICAL NURSE SPECIALIST

## 2024-06-24 NOTE — PROGRESS NOTES
[facility] [address]  [facility] Department of Endocrinology Diabetes and Metabolism   [address] [facility], [place], [place], [address]   Phone: [phone]  Fax: [phone]    Date of Service: 2024  Primary Care Physician: Alvaro Lund Jr. DO  Provider: DANIEL Chicas - CNS      Reason for the visit:  Postsurgical Hypothyroidism    History of Present Illness:  The history is provided by the patient. No  was used. Accuracy of the patient data is excellent.         Brenda Ortiz is a very pleasant 44 y.o. 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 Pathology was benign     She is currently on Levothyroxine 200 mcg Monday thru Saturday, 0.5 tab  . Patient takes levothyroxine in the morning at empty stomach, wait one hour before eating , avoid multivitamins containing calcium  or iron with it.      Lab Results   Component Value Date/Time    TSH 0.43 2024 03:05 PM    T4FREE 1.7 2024 03:05 PM    E4CPNUN 8.9 2021 04:04 PM     PAST MEDICAL HISTORY   Past Medical History:   Diagnosis Date    YVONNE (acute kidney injury) ([facility]) 2014    Anxiety     Blood clot in the legs 2011    left leg    Depression     Thyroid disease        PAST SURGICAL HISTORY   Past Surgical History:   Procedure Laterality Date     SECTION  08/29/2011    X2    HYSTERECTOMY (CERVIX STATUS UNKNOWN)      MRI BREAST BX USING DEVICE LEFT Left 2023    MRI BREAST BX USING DEVICE LEFT 2023 [facility]    THROMBECTOMY      TUBAL LIGATION      VENA CAVA FILTER PLACEMENT  2011       SOCIAL HISTORY   Tobacco:   reports that she quit smoking about 12 years ago. Her smoking use included cigarettes. She has never used smokeless tobacco.  Alcohol:   reports no history of alcohol use.  Drugs:   reports no history of drug use.    FAMILY HISTORY   Family History   Problem Relation Age of Onset

## 2024-06-25 ENCOUNTER — TELEPHONE (OUTPATIENT)
Dept: ENDOCRINOLOGY | Age: 44
End: 2024-06-25

## 2024-06-25 NOTE — TELEPHONE ENCOUNTER
----- Message from DANIEL Chicas - CNS sent at 6/24/2024  8:20 AM EDT -----  I have reviewed BW and will discuss at appt today

## 2024-08-17 DIAGNOSIS — E89.0 POSTOPERATIVE HYPOTHYROIDISM: ICD-10-CM

## 2024-08-19 RX ORDER — LEVOTHYROXINE SODIUM 0.2 MG/1
TABLET ORAL
Qty: 90 TABLET | Refills: 5 | Status: SHIPPED | OUTPATIENT
Start: 2024-08-19

## 2024-10-15 ENCOUNTER — TELEPHONE (OUTPATIENT)
Dept: BREAST CENTER | Age: 44
End: 2024-10-15

## 2024-10-15 NOTE — TELEPHONE ENCOUNTER
Called and spoke with patient who states she is ready to start the process of scheduling her breast MRI. No cycles and no labs needed.    Electronically signed by Blossom Stout RN on 10/15/24 at 11:42 AM EDT

## 2024-10-23 ENCOUNTER — TELEPHONE (OUTPATIENT)
Dept: BREAST CENTER | Age: 44
End: 2024-10-23

## 2024-10-23 NOTE — TELEPHONE ENCOUNTER
Called and left detailed message with call back number to start the process of scheduling breast MRI. She will need labs but no office visit.    Electronically signed by Blossom Stout RN on 10/23/24 at 9:49 AM EDT     GBS specific antibiotics > 2 hrs prior to birth

## 2024-10-23 NOTE — TELEPHONE ENCOUNTER
No prior authorization required for breast MRI  [id number] per [employer] -- Spoke with Chula ASHER  Ref# [id number]

## 2024-11-08 ENCOUNTER — TELEPHONE (OUTPATIENT)
Dept: ENDOCRINOLOGY | Age: 44
End: 2024-11-08

## 2024-11-08 DIAGNOSIS — R23.2 HOT FLASHES: Primary | ICD-10-CM

## 2024-11-08 NOTE — TELEPHONE ENCOUNTER
Patient is requesting labs to assess her status in menopause. She had a partial hysterectomy, ovaries still intact. She wants the hormones related to menopause with her next labs

## 2024-12-27 ENCOUNTER — HOSPITAL ENCOUNTER (OUTPATIENT)
Age: 44
Discharge: HOME | End: 2024-12-27
Payer: COMMERCIAL

## 2024-12-27 DIAGNOSIS — Z15.89 MTHFR MUTATION: ICD-10-CM

## 2024-12-27 DIAGNOSIS — R23.2 HOT FLASHES: ICD-10-CM

## 2024-12-27 DIAGNOSIS — E89.0 POSTOPERATIVE HYPOTHYROIDISM: ICD-10-CM

## 2024-12-27 DIAGNOSIS — E55.9 VITAMIN D DEFICIENCY: ICD-10-CM

## 2024-12-27 PROCEDURE — 84520 ASSAY OF UREA NITROGEN: CPT

## 2024-12-27 PROCEDURE — 84439 ASSAY OF FREE THYROXINE: CPT

## 2024-12-27 PROCEDURE — 82565 ASSAY OF CREATININE: CPT

## 2024-12-27 PROCEDURE — 36415 COLL VENOUS BLD VENIPUNCTURE: CPT

## 2024-12-27 PROCEDURE — 83002 ASSAY OF GONADOTROPIN (LH): CPT

## 2024-12-27 PROCEDURE — 82306 VITAMIN D 25 HYDROXY: CPT

## 2024-12-27 PROCEDURE — 83001 ASSAY OF GONADOTROPIN (FSH): CPT

## 2024-12-27 PROCEDURE — 84443 ASSAY THYROID STIM HORMONE: CPT

## 2024-12-27 PROCEDURE — 82670 ASSAY OF TOTAL ESTRADIOL: CPT

## 2024-12-28 LAB
25(OH)D3 SERPL-MCNC: 42.3 NG/ML (ref 30–100)
BUN SERPL-MCNC: 13 MG/DL (ref 6–20)
CREAT SERPL-MCNC: 0.9 MG/DL (ref 0.5–1)
ESTRADIOL LEVEL: 80.1 PG/ML
FSH SERPL-ACNC: 8.8 MIU/ML
GFR, ESTIMATED: 79 ML/MIN/1.73M2
LH SERPL-ACNC: 7 MIU/ML
T4 FREE SERPL-MCNC: 1.7 NG/DL (ref 0.9–1.7)
TSH SERPL DL<=0.05 MIU/L-ACNC: 1.01 UIU/ML (ref 0.27–4.2)

## 2024-12-31 ENCOUNTER — HOSPITAL ENCOUNTER (OUTPATIENT)
Dept: MRI IMAGING | Age: 44
Discharge: HOME | End: 2025-01-02
Payer: COMMERCIAL

## 2024-12-31 DIAGNOSIS — Z15.89 MTHFR MUTATION: ICD-10-CM

## 2024-12-31 DIAGNOSIS — N63.0 BREAST MASS IN FEMALE: Primary | ICD-10-CM

## 2024-12-31 PROCEDURE — 6360000004 HC RX CONTRAST MEDICATION: Performed by: RADIOLOGY

## 2024-12-31 PROCEDURE — A9585 GADOBUTROL INJECTION: HCPCS | Performed by: RADIOLOGY

## 2024-12-31 PROCEDURE — C8908 MRI W/O FOL W/CONT, BREAST,: HCPCS

## 2024-12-31 RX ORDER — GADOBUTROL 604.72 MG/ML
10 INJECTION INTRAVENOUS
Status: COMPLETED | OUTPATIENT
Start: 2024-12-31 | End: 2024-12-31

## 2024-12-31 RX ADMIN — GADOBUTROL 10 ML: 604.72 INJECTION INTRAVENOUS at 10:25

## 2025-01-02 ENCOUNTER — TELEPHONE (OUTPATIENT)
Dept: BREAST CENTER | Age: 45
End: 2025-01-02

## 2025-01-02 ENCOUNTER — TELEPHONE (OUTPATIENT)
Dept: ENDOCRINOLOGY | Age: 45
End: 2025-01-02

## 2025-01-02 NOTE — TELEPHONE ENCOUNTER
----- Message from Kirit MORROW sent at 12/29/2024  1:09 PM EST -----  Please call pt and inform her that I have reviewed labs and will discuss at upcoming appt

## 2025-01-06 ENCOUNTER — OFFICE VISIT (OUTPATIENT)
Dept: ENDOCRINOLOGY | Age: 45
End: 2025-01-06
Payer: COMMERCIAL

## 2025-01-06 VITALS
OXYGEN SATURATION: 100 % | DIASTOLIC BLOOD PRESSURE: 77 MMHG | BODY MASS INDEX: 34.86 KG/M2 | HEIGHT: 68 IN | SYSTOLIC BLOOD PRESSURE: 117 MMHG | WEIGHT: 230 LBS | HEART RATE: 80 BPM | TEMPERATURE: 96.1 F

## 2025-01-06 DIAGNOSIS — E55.9 VITAMIN D DEFICIENCY: ICD-10-CM

## 2025-01-06 DIAGNOSIS — E89.0 POSTOPERATIVE HYPOTHYROIDISM: Primary | ICD-10-CM

## 2025-01-06 PROCEDURE — G2211 COMPLEX E/M VISIT ADD ON: HCPCS | Performed by: CLINICAL NURSE SPECIALIST

## 2025-01-06 PROCEDURE — 99214 OFFICE O/P EST MOD 30 MIN: CPT | Performed by: CLINICAL NURSE SPECIALIST

## 2025-01-06 NOTE — PROGRESS NOTES
[facility] [place]  [facility] Department of Endocrinology Diabetes and Metabolism   [address], [place], [place], [address]   Phone: [phone]  Fax: [phone]    Date of Service: 2025  Primary Care Physician: Alvaro Lund Jr. DO  Provider: DANIEL Chicas - CNS      Reason for the visit:  Postsurgical Hypothyroidism    History of Present Illness:  The history is provided by the patient. No  was used. Accuracy of the patient data is excellent.         Brenda Ortiz is a very pleasant 44 y.o. 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 Pathology was benign     She is currently on Levothyroxine 200 mcg Monday thru Saturday, 0.5 tab  . Patient takes levothyroxine in the morning at empty stomach, wait one hour before eating , avoid multivitamins containing calcium  or iron with it.      Lab Results   Component Value Date/Time    TSH 1.01 2024 11:09 AM    T4FREE 1.7 2024 11:09 AM     PAST MEDICAL HISTORY   Past Medical History:   Diagnosis Date    YVONNE (acute kidney injury) (HCC) 2014    Anxiety     Blood clot in the legs 2011    left leg    Depression     Thyroid disease        PAST SURGICAL HISTORY   Past Surgical History:   Procedure Laterality Date     SECTION  08/29/2011    X2    HYSTERECTOMY (CERVIX STATUS UNKNOWN)      MRI BIOPSY BREAST W LOC DEVICE LEFT 1ST LESION Left 2023    MRI BREAST BX USING DEVICE LEFT 2023 [facility]    THROMBECTOMY      TUBAL LIGATION      VENA CAVA FILTER PLACEMENT  2011       SOCIAL HISTORY   Tobacco:   reports that she quit smoking about 13 years ago. Her smoking use included cigarettes. She has never used smokeless tobacco.  Alcohol:   reports no history of alcohol use.  Drugs:   reports no history of drug use.    FAMILY HISTORY   Family History   Problem Relation Age of Onset    Cancer Mother         SKIN

## 2025-06-02 ENCOUNTER — OFFICE VISIT (OUTPATIENT)
Age: 45
End: 2025-06-02
Payer: COMMERCIAL

## 2025-06-02 ENCOUNTER — HOSPITAL ENCOUNTER (OUTPATIENT)
Dept: GENERAL RADIOLOGY | Age: 45
Discharge: HOME | End: 2025-06-04
Payer: COMMERCIAL

## 2025-06-02 VITALS — WEIGHT: 230 LBS | BODY MASS INDEX: 34.86 KG/M2 | HEIGHT: 68 IN

## 2025-06-02 VITALS
WEIGHT: 234.2 LBS | TEMPERATURE: 97.9 F | SYSTOLIC BLOOD PRESSURE: 118 MMHG | RESPIRATION RATE: 20 BRPM | HEIGHT: 68 IN | OXYGEN SATURATION: 97 % | DIASTOLIC BLOOD PRESSURE: 70 MMHG | HEART RATE: 65 BPM | BODY MASS INDEX: 35.49 KG/M2

## 2025-06-02 DIAGNOSIS — N63.0 BREAST MASS IN FEMALE: Primary | ICD-10-CM

## 2025-06-02 DIAGNOSIS — Z12.31 ENCOUNTER FOR SCREENING MAMMOGRAM FOR BREAST CANCER: Primary | ICD-10-CM

## 2025-06-02 DIAGNOSIS — Z12.31 ENCOUNTER FOR SCREENING MAMMOGRAM FOR BREAST CANCER: ICD-10-CM

## 2025-06-02 PROCEDURE — 77063 BREAST TOMOSYNTHESIS BI: CPT

## 2025-06-02 PROCEDURE — 99214 OFFICE O/P EST MOD 30 MIN: CPT | Performed by: SURGERY

## 2025-06-02 NOTE — PATIENT INSTRUCTIONS
Breast MRI 01/2026 Office will call November to discuss moving forward with.     1 year office visit with mammogram same day.    Any questions or concerns, please contact the office at [phone].

## 2025-06-02 NOTE — PROGRESS NOTES
Date of visit: 6/2/2025    10/24/23  05/20/24  06/02/25    DIAGNOSIS:  1. (10/24/23) Abnormal findings on MRI  * LEFT  * Film consult - LEFT MRI biopsy ordered  * Biopsy not performed.  Lesion appeared bengin  2. (10/24/23) Higher risk for breast cancer  * WILIAN 27%  3. Family history of breast cancer  * Maternal aunt  * Maternal grandmother  4. (05/13/25) Genetics visit  * VUS HARI  5. Known MTHFR mutation  * clots    IMAGING/PROCEDURES:  1. (05/08/23) BILATERAL st-mammogram: BIRADS-0  * RIGHT asymmetry  2. (05/12/23) RIGHT dt-mammogram: BIRADS-1  * US not done  3. (09/19/23) MRI ([facility]): BIRADS-4  * LEFT (lower-central) 8mm NMLE  4. (10/11/23) Film consult  * Limited MRI  5. (11/07/23) MRI biopsy scheduled/not performed  * Lesion appears benign  6. (05/20/24) BILATERAL st-mammogram: BIRADS-1  7. (12/31/24) MRI: BIRADS-1    Check same day mammo  Check new family?  MRI yearly or every other  Check genetics drawn 05/13    Breast History  Brenda Ortiz was in the office for a scheduled follow-up.    Brenda initially presented with findings on breast MRI from an outside facility.  We did schedule her for a biopsy but at the time of the biopsy this finding was no longer present.  We continue to follow her first issues of risk and family history.  She recently underwent genetic testing.  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    Breast Symptoms  Brenda has no symptoms to report.    Breast Examination  A comprehensive examination demonstrates no concerns in either breast or axilla.    Breast Imaging  Brenda underwent a breast MRI in December 2024 which was read as negative.  Today prior to her office visit she had a screening mammogram which was also negative.    Assessment/Plan  Brenda Ortiz was in the office today for follow-up regarding history of findings on imaging, a family history of breast cancer and now a genetic test with a variant.    I informed Brenda that first and foremost based